# Patient Record
Sex: FEMALE | Race: ASIAN | NOT HISPANIC OR LATINO
[De-identification: names, ages, dates, MRNs, and addresses within clinical notes are randomized per-mention and may not be internally consistent; named-entity substitution may affect disease eponyms.]

---

## 2016-12-15 NOTE — H&P ADULT. - HISTORY OF PRESENT ILLNESS
67 yo woman with PMH of CAD s/p PCI (x2 to pLAD 7/2016), CHF, HTN, HLD, chronic anemia, DM, sent in by MD for progressive shortness of breath, increased LE swelling and abdominal discomfort in the past 2-3 weeks. With regards to the shortness of breath, patient endorsed dyspnea on exertion that has been worsening as well as orthopnea with the bilateral LE swelling. She denies CP and denies palpitations. There is occasional cough with clear sputum: denies rhinorrhea, nasal congestion, recent sick contact. Denies fevers, chills, sweats.  As for the abdominal discomfort, she told her PMD that her abdomen appeared to be more swollen. She recently had an US of the abdomen done on this past Friday; when her visiting nurse came and called the MD about the results, she was told she had "fluid in the abdomen" and was sent to the ED for further evaluation. Of note, patient had outpatient stress test ~2 weeks ago that a was reported to be normal. Patient last mammogram ~2years ago (reported to be normal) and recent colonoscopy (reported to be normal).

## 2016-12-15 NOTE — H&P ADULT. - PROBLEM SELECTOR PLAN 3
Elevated BUN and CR suggestive of prerenal etiology. Unclear of cardio-renal in setting of acute heart failure. Low suspicion of obstruction as patient endorses adequate urine output  Trend BMP  Check urine studies

## 2016-12-15 NOTE — H&P ADULT. - ATTENDING COMMENTS
Dr. Nathaniel Medrano accepted patient's case from ED: requested hosptialist team to complete admission. Patient previously unknown to me and I was assigned to case. Discussed plan of care with patient: expressed understanding of plan. Sign out given to NP. Dr. Medrano to continue care

## 2016-12-15 NOTE — ED PROVIDER NOTE - OBJECTIVE STATEMENT
66F hx CAD c/b NSTEMI s/p recent stents x 2 to pLAD (7/2016 - Milena Morgan), anemia, HTN, HLD, DM, CHF, sent in my visiting nurse for sob x approx 3 weeks.  +cough productive of clear sputum.  +orthopnea with PND.  +LE swelling.  reports abdominal pain.  No fever/chills.     PMD - Perfecto Pearson

## 2016-12-15 NOTE — H&P ADULT. - PROBLEM SELECTOR PLAN 2
CT abd/pelvis notable for: "Moderate amount of abdominal and pelvic ascites.  Distended gallbladder with diffusely thickened wall containing areas of calcification. Irregular enhancement of the wall likely representing necrosis." No other reported pathology noted on exam.  Consider GI consult for possible therapeutic paracentesis  Elevated Alk phos, check GGT  Check hepatic panel

## 2016-12-15 NOTE — ED ADULT NURSE NOTE - OBJECTIVE STATEMENT
67 y/o F, came today c/o sob and edema to both legs and abdomen increasingly, pt alert ox3, denies any chest pain, pt had SPRAGUE, lungs clear bilaterally, breathing non labored. to room air. attached to CM with NSR, safety  and comfort provided. continue to monitor.

## 2016-12-15 NOTE — H&P ADULT. - PROBLEM SELECTOR PLAN 4
C/W Aspirin/Plavix/Statin  C/W coreg   Hold Lisinopril in setting of EB Patient prior Na range from 129-133. May be chronic  May be affected by volume overload state  check serum osm  Trend Na

## 2016-12-15 NOTE — H&P ADULT. - PROBLEM SELECTOR PROBLEM 7
Prophylactic measure Type 2 diabetes mellitus without complication, unspecified long term insulin use status

## 2016-12-15 NOTE — ED PROVIDER NOTE - CARE PLAN
Principal Discharge DX:	Acute on chronic congestive heart failure, unspecified congestive heart failure type

## 2016-12-15 NOTE — H&P ADULT. - PROBLEM SELECTOR PLAN 7
HSQ for DVT ppx Hold Metformin/Glipizide  Corrective SSI  Monitor FS  Check HgbA1c  Hypoglycemia protocol

## 2016-12-15 NOTE — H&P ADULT. - FAMILY HISTORY
Sibling  Still living? Yes, Estimated age: Age Unknown  Family history of stroke, Age at diagnosis: Age Unknown  Family history of Alzheimer's disease, Age at diagnosis: Age Unknown  Family history of diabetes mellitus, Age at diagnosis: Age Unknown  Family history of hyperlipidemia, Age at diagnosis: Age Unknown     Father  Still living? Unknown  Family history of stroke, Age at diagnosis: Age Unknown

## 2016-12-15 NOTE — H&P ADULT. - PROBLEM SELECTOR PLAN 6
Hold Metformin/Glipizide  Corrective SSI  Monitor FS  Check HgbA1c  Hypoglycemia protocol Moderately controlled continue with Coreg  Lisinopril hold in setting of EB

## 2016-12-15 NOTE — H&P ADULT. - PROBLEM SELECTOR PLAN 5
Moderately controlled continue with Coreg  Lisinopril hold in setting of EB C/W Aspirin/Plavix/Statin  C/W coreg   Hold Lisinopril in setting of EB

## 2016-12-15 NOTE — H&P ADULT. - LAB RESULTS AND INTERPRETATION
Personally reviewed: leukocytosis (chronic? prior labs also elevated) microcytic anemia (at baseline hgb 9) hyponatremia, hemolyze K (repeat K VBG 4.9) EB cr 1.73 elevated alk phos. Trop <0.01

## 2016-12-15 NOTE — H&P ADULT. - PROBLEM SELECTOR PLAN 1
Patient with shortness of breath, orthopnea and increased LE swelling, CXR and CT chest suggestive of pulm edema: concerning for acute on chronic congestive heart failure. BNP elevated 818 . Cardiac enzymes thus far negative  Patient given Lasix 40 IV x1 in ED and patient states she has been urinating  Monitor on tele  Trend CE  Continue with diuresis  Monitor daily weight and I/O  Consider cardiology consult in AM Patient with shortness of breath, orthopnea and increased LE swelling, CXR and CT chest suggestive of pulm edema: concerning for acute on chronic congestive heart failure. BNP elevated 818 . Cardiac enzymes thus far negative  Patient given Lasix 40 IV x1 in ED and patient states she has been urinating  Monitor on tele  Trend CE  Continue with diuresis  Monitor daily weight and I/O  Consider cardiology consult in AM  Notable leukocytosis on CBC (may be chronic) no appreciable infectious foci or findings on CT to suggests active infection.  Trend CBC and monitor off abx.

## 2016-12-15 NOTE — H&P ADULT. - PROBLEM SELECTOR PROBLEM 6
Type 2 diabetes mellitus without complication, unspecified long term insulin use status Hypertension

## 2016-12-15 NOTE — H&P ADULT. - EKG AND INTERPRETATION
Personally reviewed EKG: SR WI 89 bpm QTc 462 LAD, no appreciable ST changes, chronic TW inversion V2-V3

## 2016-12-15 NOTE — H&P ADULT. - ASSESSMENT
67 yo woman with PMH of CAD s/p PCI (x2 to pLAD 7/2016), CHF, HTN, HLD, chronic anemia, DM, sent in by MD for progressive shortness of breath, increased LE swelling and abdominal discomfort in the past 2-3 weeks.

## 2016-12-15 NOTE — ED PROVIDER NOTE - PHYSICAL EXAMINATION
NAD, NCAT, MMM, Trachea midline, PERRL, CTAB, Non-tachy, Normal perfusion, soft, NTND, No edema, No deformity of extremities, Appropriate, Cooperative, No rashes, CN grossly intact, Normal coordination, No focal motor or sensory deficits.

## 2016-12-15 NOTE — ED ADULT NURSE NOTE - PMH
Arthritis  both knees  CAD (coronary artery disease)    Diabetes    Hyperlipidemia    Hypertension    NSTEMI (non-ST elevated myocardial infarction)

## 2016-12-15 NOTE — ED PROVIDER NOTE - ATTENDING CONTRIBUTION TO CARE
Agree with resident history   NO GI bleeding  No liver or pancreatic issues  No f/s/c/nausea/ vomitting  + orthopnea, larios, pnd, le swelling and abdominal swelling (3 weeks) taking furisemide, slightly decreased uo with no hx of ckd, compliant with meds, NO CP  On exam, AVSS, Bi basilar crackles, mild pallor (baseline color) MMM, no JVD, s1, s,2 soft nt adbomen, + distension- feels ascitic, + LE edema piting 2+  no petechie

## 2016-12-15 NOTE — ED ADULT NURSE REASSESSMENT NOTE - NS ED NURSE REASSESS COMMENT FT1
Report received from RASHIDA Bettencourt. Pt denies current CP, difficulty breathing, no SOB. Pt awaiting CT scan, resting comfortably maintained on cardiac monitor- NSR. A&Ox3, safety maintained.

## 2016-12-21 NOTE — PROVIDER CONTACT NOTE (MEDICATION) - ASSESSMENT
Patient alert and oriented x4. Patient given maalox PRN for dyspepsia at 2019. Patient states she takes the medication at home. Pantoprazole listed in H&P document.

## 2016-12-22 NOTE — PROVIDER CONTACT NOTE (MEDICATION) - ACTION/TREATMENT ORDERED:
NP made aware. NP stated to give first dose now with bedtime medications and to give again with 0600 medications.

## 2016-12-22 NOTE — PROVIDER CONTACT NOTE (MEDICATION) - ASSESSMENT
Patient alert and oriented x4. MAR allows potassium to be given now with bedtime medications and is scheduled for 0600 administration

## 2016-12-28 NOTE — DIETITIAN INITIAL EVALUATION ADULT. - ADHERENCE
Good adherence for diabetes diet with Hg A1c 6.1.  However, uses salt in cooking and does not limit fluids.  Drinks diet pepsi and a lot of water.

## 2016-12-28 NOTE — DIETITIAN INITIAL EVALUATION ADULT. - PROBLEM SELECTOR PLAN 4
Patient prior Na range from 129-133. May be chronic  May be affected by volume overload state  check serum osm  Trend Na

## 2016-12-28 NOTE — DIETITIAN INITIAL EVALUATION ADULT. - PROBLEM SELECTOR PLAN 1
Patient with shortness of breath, orthopnea and increased LE swelling, CXR and CT chest suggestive of pulm edema: concerning for acute on chronic congestive heart failure. BNP elevated 818 . Cardiac enzymes thus far negative  Patient given Lasix 40 IV x1 in ED and patient states she has been urinating  Monitor on tele  Trend CE  Continue with diuresis  Monitor daily weight and I/O  Consider cardiology consult in AM  Notable leukocytosis on CBC (may be chronic) no appreciable infectious foci or findings on CT to suggests active infection.  Trend CBC and monitor off abx.

## 2016-12-28 NOTE — DIETITIAN INITIAL EVALUATION ADULT. - NS AS NUTRI INTERV ED CONTENT
Nutrition relationship to health/disease/Reviewed reasons for salt and fluid restriction and discussed some strategies for dealing with fluid restriction

## 2017-01-02 ENCOUNTER — RESULT REVIEW (OUTPATIENT)
Age: 67
End: 2017-01-02

## 2017-01-08 ENCOUNTER — RESULT REVIEW (OUTPATIENT)
Age: 67
End: 2017-01-08

## 2017-01-19 NOTE — PROVIDER CONTACT NOTE (OTHER) - BACKGROUND
pt admitted with CHF exacerbation. Ascites s/p paracentesis yesterday. Complaining of nausea and vomiting. Received IV Zofran this AM (1040) with no relief.
Admitted for Heart Failure. Pt was admitted wit a complaint of abdominal discomfort. Pt had 3 paracenteses this admission. Tylenol is prescribed to be given q6hrs. Last dose was given at 2104.
Patient admitted for CHF
Patient admitted for CHF exacerbation, increased SOB + LE edema, and abdominal discomfort.  PMH of DM T2, HTN, HLD, CAD, NSTEMI, arthritis
Patient admitted for Heart Failure, Hyponatremia, Diabetes Mellitus, Hypertension, Coronary Artery Disease, Acute Kidney Injury, Ascites.
Patient admitted for heart failure.
patient admitted  for  a heart failure
patient admitted for heart failure
patient admitted for heart failure
DX:HF  PMH:NSTEMI, CAD, arthritis, HLD, HTN, Diabetes
Pt admitted w/ CHF, pt has ascites. Taking vanco for peritonitis.

## 2017-01-19 NOTE — PROVIDER CONTACT NOTE (OTHER) - ACTION/TREATMENT ORDERED:
CDIFF sample sent. awaiting results
MAG Stat
Have to wait 6 hours until next dose of Zofran.
Pilo Campbell NP aware, review pt orders, ordered to hold vanco.
Informed Bobbi Rogers NP-will repeat bs in the morning.
NP made aware. Hold furosemide and carvedilol this AM. Continue to monitor patient.
NP made aware. NP to assess site.  NP assessed site- assessed as moisture associated dermatitis. Instructed to apply protective barrier cream.
NP notified  and with orders
NP notified and aware.  Continue monitoring Patient and maintain safety.  No new orders made at this time
as per NP will notify MD brambila
DIVYA [facility] authorized RN to give Tylenol early. Continue to monitor pt.
NP aware & reviewed patient's Lab Vancomycin Level Trough results, all other lab results & orders. Ordered to administer Vancomycin IVPB 1000 mg as ordered. No new orders at this time.

## 2017-01-19 NOTE — PROVIDER CONTACT NOTE (OTHER) - NAME OF MD/NP/PA/DO NOTIFIED:
DIVYA May
DIVYA Rogers
DIVYA Sam
DIVYA Tomas
NP Pilo Campbell
PK NP
Pilo Campbell NP
Zheng, NP
Chuck Bernabe NP

## 2017-01-19 NOTE — DISCHARGE NOTE FOR THE EXPIRED PATIENT - HOSPITAL COURSE
67yo female who presented with acute on chronic heart failure in early December. During hospital stay patient had abdominal distention and was found to have ascites that underwent paracentesis multiple times with inconclusive cytology. Patient underwent CT scan of the abdomen and pelvis on 17 that showed concern for peritoneal carcinomatosis and cholecystitis. Patient was scheduled for laparoscopic peritoneal biopsy. 2 days prior to expiration patient developed renal failure, hyponatremia and underwent hemodialysis. On morning of expiration, patient was code blue on floors with loss of pulses. Underwent CPR for 8 minutes with ROSC after 4 rounds of epinephrine. Transferred to MICU, given bicarb, calcium. Patient was made DNI by family. Patient became bradycardic and  at 1426 on 17.

## 2017-01-19 NOTE — PROVIDER CONTACT NOTE (OTHER) - ASSESSMENT
Pt A&Ox4, pt sleeping.
pt complains of nausea and vomiting. VSS Patient did not eat lunch today
A&O x4.  Patient currently receiving IVF NS @ 75 ml/hr x1 Liter.  Sodium chloride 1 Gram added to medication regimen
A&Ox4. VSS. Pain is 8/10, aching. Next dose of Tylenol is due at 0304.
Patient A&Ox4, VSS. Excoriated skin in between gluteal crack. Area of excoriation is about 3x1 cm. Erythema noted around area. Area has scant sanguinous drainage.
Patient A&Ox4. BP 87/60, HR 77, RR 18, O2 saturation 99% on room air. Patient denies chest pain or shortness of breath. Patient is due to receive carvedilol 37.5 and furosemide 40 mg this AM. Carvedilol held last night for BP 95/62 at 2121.
Patient's Lab Vancomycin Level, Trough result 18.9. Patient has an active order for Vancomycin IVPB 1000 mg every 12 hours and next dose scheduled for 00:00 on 26-Dec-2016.
patient denies any s/s of hyperglycemia
patient is A&Ox no s/s of distress    Temperature   Temp (F) : 97.2 Degrees F  Heart Rate  Heart Rate Heart Rate (beats/min) : 85 /min  Noninvasive Blood Pressure  BP Systolic Systolic : 112 mm Hg  Noninvasive Blood Pressure  BP Diastolic Diastolic (mm Hg) : 74 mm Hg  Noninvasive Blood Pressure  Blood Pressure - Site Site : left upper arm  Noninvasive Blood Pressure  Blood Pressure - Method Method : electronic  Respiratory/Pulse Oximetry  Respiration Rate (breaths/min) Respiration Rate (breaths/min) : 18 /min  Respiratory/Pulse Oximetry  SpO2 (%) SpO2 (%) : 100 %
patient is alert and confused-awake most of the night
patient is alert and oriented x 3.BP 92/52, HR 86,POX OF 98,TEMP 98,R-18
pt c/o that she frequently needs to use restroom.

## 2017-01-19 NOTE — PROVIDER CONTACT NOTE (OTHER) - DATE AND TIME:
06-Jan-2017 12:45
07-Jan-2017 06:40
12-Jan-2017 04:44
13-Jan-2017 02:10
16-Jan-2017 23:20
18-Jan-2017 05:00
19-Jan-2017
19-Jan-2017
19-Jan-2017 04:58
26-Dec-2016 01:39
28-Dec-2016 17:45
25-Dec-2016 01:09

## 2017-01-19 NOTE — DISCHARGE NOTE FOR THE EXPIRED PATIENT - REASON FOR ADMISSION
Pt was admitted 12/15/16 for shortness of breath, increased LE swelling, and abdominal discomfort for 2-3 weeks. Pt was treated for CHF exacerbation. Pt was also found to have significant ascites, and was worked up for possible metastatic CA.

## 2017-01-19 NOTE — PROVIDER CONTACT NOTE (OTHER) - RECOMMENDATIONS
continue to monitor.
Assess patient. Review patient's Lab Vancomycin Level Trough results, all other lab results and orders.
Assess pt, review pt orders, continue to monitor pt, hold vanco.
Holding pre meal insulin. patient has nott eaten lunch
Please give me an authorization to administer Tylenol early.
mak to monitor

## 2017-01-19 NOTE — PROVIDER CONTACT NOTE (OTHER) - SITUATION
Vanco trough 22.9
patient did not eat her lunch. complains of nausea and vomiting. Holding insulin
8 beats wct
BS of 581
Excoriated skin in between gluteal crack
Gastric  occult (+) results
Manual BP 87/60
Patient's Lab Vancomycin Level, Trough resulted.
Pt is complaining of abdominal pain but Pain medication is not due
Repeat BMP: sodium level 122
patient vomited coffee  ground  about 250cc
pt has had 5 diarrhea BMs since 00:00 1/7/17

## 2017-01-19 NOTE — PROVIDER CONTACT NOTE (OTHER) - REASON
Odalis Kramer reported (+)  gastro occult
5 BMs since 00:00 1/7/17
8 beats wct
Excoriated skin in between gluteal crack
Lab Results.
Manual BP 87/60
Pt is complaining of abdominal pain but Pain medication is not due
Repeat BMP: sodium level 122
Vanco trough 22.9
report from Reji brenner BS of 226
vomited coffee ground  about 250 cc
patient did not eat her lunch. complains of nausea and vomiting. Holding insulin

## 2017-01-20 ENCOUNTER — APPOINTMENT (OUTPATIENT)
Dept: SURGICAL ONCOLOGY | Facility: HOSPITAL | Age: 67
End: 2017-01-20

## 2023-07-04 NOTE — DIETITIAN INITIAL EVALUATION ADULT. - ENERGY NEEDS
63 Ht - 62.5"  # +/- 10%  Current wt - 193.5# with ascites and edema  BMI not really appropriate in setting of fluid retention but it is 34.3  skin intact  Fluid per medicine MST score 2 or more.

## 2024-08-29 NOTE — PATIENT PROFILE ADULT. - FUNCTIONAL SCREEN CURRENT LEVEL: AMBULATION, MLM
Endocrine Refill protocol for oral antihypertensive medications    Protocol Criteria:  FAILED  Reason: No labs completed in required time frame    -Appointment with Endocrinology completed in the last 6 months or scheduled in the next 3 months    -BMP or CMP has been completed in the last 12 months     -GFR is greater than or equal to 50    Verify the above has been completed or scheduled in the appropriate timeline. If so can send a 90 day supply with 1 refill.   Verify BMP or CMP has been completed in last year  Verify last GFR result     Last completed office visit:8/29/2024 Dania Mayorga DO   Next scheduled Follow up: 11/2  Future Appointments   Date Time Provider Department Center   9/12/2024 12:45 PM [facility] DIABETIC EDUCATOR ENDO [facility]   10/10/2024  2:00 PM Domonique Green APN [facility]   11/21/2024 12:30 PM Dania Mayorga DO [id number] [facility]      Last BMP or CMP completion date:  Lab Results   Component Value Date    GFRAA 98 06/30/2022    GFRNAA 85 06/30/2022    EGFRCR 102 09/02/2023   Office note:  Please make sure you're taking the glipizide 10mg once a day.   
(3) assistive equipment and person
